# Patient Record
Sex: FEMALE | Race: BLACK OR AFRICAN AMERICAN
[De-identification: names, ages, dates, MRNs, and addresses within clinical notes are randomized per-mention and may not be internally consistent; named-entity substitution may affect disease eponyms.]

---

## 2017-12-06 ENCOUNTER — HOSPITAL ENCOUNTER (OUTPATIENT)
Dept: HOSPITAL 45 - C.LABSPEC | Age: 32
Discharge: HOME | End: 2017-12-06
Attending: PHYSICIAN ASSISTANT
Payer: COMMERCIAL

## 2017-12-06 DIAGNOSIS — N89.8: Primary | ICD-10-CM

## 2017-12-12 ENCOUNTER — HOSPITAL ENCOUNTER (OUTPATIENT)
Dept: HOSPITAL 45 - C.MRIBC | Age: 32
Discharge: HOME | End: 2017-12-12
Attending: PHYSICIAN ASSISTANT
Payer: COMMERCIAL

## 2017-12-12 DIAGNOSIS — G35: Primary | ICD-10-CM

## 2017-12-12 NOTE — DIAGNOSTIC IMAGING REPORT
MRI THE THORACIC SPINE WITHOUT AND WITH GADOLINIUM



CLINICAL HISTORY: Multiple sclerosis. Recent onset of lower limb numbness.    



COMPARISON STUDY:  No previous studies for comparison.



FINDINGS: Imaging was performed in the sagittal and axial planes, before and

after the administration of 9.5 cc of intravenous Gadavist.



There are no suspicious areas of marrow replacement.



No disc herniations are visualized.



No thoracic cord lesions are visualized.



Postcontrast images reveal no pathologically enhancing lesions.



IMPRESSION:  Normal MRI of the thoracic spine. 









Electronically signed by:  Dewayne Lauren M.D.

12/12/2017 9:03 AM



Dictated Date/Time:  12/12/2017 9:00 AM

## 2017-12-12 NOTE — DIAGNOSTIC IMAGING REPORT
LUMBAR SPINE COMBINATION



CLINICAL HISTORY: 32 years-old Female presenting with MS diagnosed in 2012,

recent onset lower limb numbness, no headache or dizziness or other complaints. 



TECHNIQUE: Multisequence, multiplanar MR imaging of the lumbar spine was

performed before and after the administration of intravenous contrast. IV

contrast: 9.5 mL of Gadavist. 



COMPARISON: None.



FINDINGS:



Localizer images: Unremarkable.



Normal lumbar lordosis. Vertebral bodies maintain normal height, alignment, and

bone marrow signal intensity. Intervertebral disc spaces preserved. No

degenerative change. No neural foraminal or spinal canal narrowing.



Spinal cord ends in good position at the inferior endplate of L1. Cauda equina

normal in morphology. Abnormal signal intensity of the spinal cord at T11 is

partially visualized on sagittal imaging only. Postcontrast imaging does not

include this level. Please see separately dictated MR of the thoracic spine.

Postcontrast imaging within the lumbar spine demonstrates no abnormal

enhancement.



Paraspinal soft tissues within normal limits. 



IMPRESSION:

1.  Normal lumbar spine. No evidence of demyelinating disease in the lumbar

region. Partially visualized abnormal signal intensity in the thoracic spinal

cord at T11. Please see separately dictated MR of the thoracic spine.







Electronically signed by:  Jonathan Mckeon M.D.

12/12/2017 9:11 AM



Dictated Date/Time:  12/12/2017 8:58 AM

## 2018-01-11 ENCOUNTER — HOSPITAL ENCOUNTER (OUTPATIENT)
Dept: HOSPITAL 45 - C.LABSPEC | Age: 33
Discharge: HOME | End: 2018-01-11
Attending: OBSTETRICS & GYNECOLOGY
Payer: COMMERCIAL

## 2018-01-11 DIAGNOSIS — R82.90: Primary | ICD-10-CM
